# Patient Record
Sex: FEMALE | Race: WHITE | Employment: FULL TIME | URBAN - METROPOLITAN AREA
[De-identification: names, ages, dates, MRNs, and addresses within clinical notes are randomized per-mention and may not be internally consistent; named-entity substitution may affect disease eponyms.]

---

## 2017-01-08 ENCOUNTER — APPOINTMENT (OUTPATIENT)
Dept: GENERAL RADIOLOGY | Age: 62
End: 2017-01-08
Attending: NURSE PRACTITIONER
Payer: COMMERCIAL

## 2017-01-08 ENCOUNTER — HOSPITAL ENCOUNTER (EMERGENCY)
Age: 62
Discharge: HOME | End: 2017-01-08
Attending: EMERGENCY MEDICINE
Payer: COMMERCIAL

## 2017-01-08 VITALS
OXYGEN SATURATION: 100 % | SYSTOLIC BLOOD PRESSURE: 161 MMHG | WEIGHT: 293 LBS | TEMPERATURE: 97.7 F | HEIGHT: 65 IN | RESPIRATION RATE: 16 BRPM | BODY MASS INDEX: 48.82 KG/M2 | DIASTOLIC BLOOD PRESSURE: 83 MMHG | HEART RATE: 96 BPM

## 2017-01-08 DIAGNOSIS — M62.838 MUSCLE SPASM: ICD-10-CM

## 2017-01-08 DIAGNOSIS — N39.0 URINARY TRACT INFECTION WITHOUT HEMATURIA, SITE UNSPECIFIED: ICD-10-CM

## 2017-01-08 DIAGNOSIS — M54.9 ACUTE BILATERAL BACK PAIN, UNSPECIFIED BACK LOCATION: Primary | ICD-10-CM

## 2017-01-08 LAB
APPEARANCE UR: ABNORMAL
BACTERIA URNS QL MICRO: ABNORMAL /HPF
BILIRUB UR QL CFM: NEGATIVE
COLOR UR: ABNORMAL
EPITH CASTS URNS QL MICRO: ABNORMAL /LPF
GLUCOSE UR STRIP.AUTO-MCNC: NEGATIVE MG/DL
HGB UR QL STRIP: NEGATIVE
KETONES UR QL STRIP.AUTO: 15 MG/DL
LEUKOCYTE ESTERASE UR QL STRIP.AUTO: ABNORMAL
NITRITE UR QL STRIP.AUTO: NEGATIVE
PH UR STRIP: 6 [PH] (ref 5–8)
PROT UR STRIP-MCNC: 30 MG/DL
RBC #/AREA URNS HPF: ABNORMAL /HPF (ref 0–5)
SP GR UR REFRACTOMETRY: 1.03 (ref 1–1.03)
UA: UC IF INDICATED,UAUC: ABNORMAL
UROBILINOGEN UR QL STRIP.AUTO: 1 EU/DL (ref 0.2–1)
WBC URNS QL MICRO: ABNORMAL /HPF (ref 0–4)

## 2017-01-08 PROCEDURE — 72072 X-RAY EXAM THORAC SPINE 3VWS: CPT

## 2017-01-08 PROCEDURE — 87086 URINE CULTURE/COLONY COUNT: CPT | Performed by: EMERGENCY MEDICINE

## 2017-01-08 PROCEDURE — 74011250637 HC RX REV CODE- 250/637: Performed by: NURSE PRACTITIONER

## 2017-01-08 PROCEDURE — 99284 EMERGENCY DEPT VISIT MOD MDM: CPT

## 2017-01-08 PROCEDURE — 72100 X-RAY EXAM L-S SPINE 2/3 VWS: CPT

## 2017-01-08 PROCEDURE — 81001 URINALYSIS AUTO W/SCOPE: CPT | Performed by: NURSE PRACTITIONER

## 2017-01-08 RX ORDER — CYCLOBENZAPRINE HCL 10 MG
10 TABLET ORAL
Status: COMPLETED | OUTPATIENT
Start: 2017-01-08 | End: 2017-01-08

## 2017-01-08 RX ORDER — CYCLOBENZAPRINE HCL 10 MG
10 TABLET ORAL
Qty: 15 TAB | Refills: 0 | Status: SHIPPED | OUTPATIENT
Start: 2017-01-08 | End: 2018-07-14

## 2017-01-08 RX ORDER — HYDROCODONE BITARTRATE AND ACETAMINOPHEN 5; 325 MG/1; MG/1
1 TABLET ORAL
Qty: 8 TAB | Refills: 0 | Status: SHIPPED | OUTPATIENT
Start: 2017-01-08 | End: 2018-07-14

## 2017-01-08 RX ORDER — CEPHALEXIN 500 MG/1
500 CAPSULE ORAL 2 TIMES DAILY
Qty: 14 CAP | Refills: 0 | Status: SHIPPED | OUTPATIENT
Start: 2017-01-08 | End: 2017-01-15

## 2017-01-08 RX ADMIN — CYCLOBENZAPRINE HYDROCHLORIDE 10 MG: 10 TABLET, FILM COATED ORAL at 09:59

## 2017-01-08 NOTE — PROGRESS NOTES
CM received consult to assist pt with problem solving her need to get home, also to go by the bank and to fill prescriptions. Pt would like to take a cab but noted that she used EMS to get to hospital as she was unable to drive self due to road conditions and that EMS parked a block away but she had someone on either side to assist with walking. CM encouraged her to call family or friends and also provided her list of five private pay cab companies for her to call. Encouraged her to call from ED and to advise that she is requesting hand held assistance to get into her door as not sure all providers will accommodate this request.    Pt is aware and understands.     PING Irizarry

## 2017-01-08 NOTE — LETTER
[address] 
[address] [facility] [address] 
[phone] Work/School Note Date: 1/8/2017 To Whom It May concern: 
 
Princess Lynch was seen and treated today in the emergency room by the following provider(s): 
Attending Provider: Vicki Bright MD 
Nurse Practitioner: Bandar Chairez NP. Princess Lynch may return to work on 01/10/2017. Sincerely, Bandar Chairez NP

## 2017-01-08 NOTE — DISCHARGE INSTRUCTIONS
Back Pain: Care Instructions  Your Care Instructions    Back pain has many possible causes. It is often related to problems with muscles and ligaments of the back. It may also be related to problems with the nerves, discs, or bones of the back. Moving, lifting, standing, sitting, or sleeping in an awkward way can strain the back. Sometimes you don't notice the injury until later. Arthritis is another common cause of back pain. Although it may hurt a lot, back pain usually improves on its own within several weeks. Most people recover in 12 weeks or less. Using good home treatment and being careful not to stress your back can help you feel better sooner. Follow-up care is a key part of your treatment and safety. Be sure to make and go to all appointments, and call your doctor if you are having problems. Its also a good idea to know your test results and keep a list of the medicines you take. How can you care for yourself at home? · Sit or lie in positions that are most comfortable and reduce your pain. Try one of these positions when you lie down:  ¨ Lie on your back with your knees bent and supported by large pillows. ¨ Lie on the floor with your legs on the seat of a sofa or chair. Gage Butler on your side with your knees and hips bent and a pillow between your legs. ¨ Lie on your stomach if it does not make pain worse. · Do not sit up in bed, and avoid soft couches and twisted positions. Bed rest can help relieve pain at first, but it delays healing. Avoid bed rest after the first day of back pain. · Change positions every 30 minutes. If you must sit for long periods of time, take breaks from sitting. Get up and walk around, or lie in a comfortable position. · Try using a heating pad on a low or medium setting for 15 to 20 minutes every 2 or 3 hours. Try a warm shower in place of one session with the heating pad. · You can also try an ice pack for 10 to 15 minutes every 2 to 3 hours.  Put a thin cloth between the ice pack and your skin. · Take pain medicines exactly as directed. ¨ If the doctor gave you a prescription medicine for pain, take it as prescribed. ¨ If you are not taking a prescription pain medicine, ask your doctor if you can take an over-the-counter medicine. · Take short walks several times a day. You can start with 5 to 10 minutes, 3 or 4 times a day, and work up to longer walks. Walk on level surfaces and avoid hills and stairs until your back is better. · Return to work and other activities as soon as you can. Continued rest without activity is usually not good for your back. · To prevent future back pain, do exercises to stretch and strengthen your back and stomach. Learn how to use good posture, safe lifting techniques, and proper body mechanics. When should you call for help? Call your doctor now or seek immediate medical care if:  · You have new or worsening numbness in your legs. · You have new or worsening weakness in your legs. (This could make it hard to stand up.)  · You lose control of your bladder or bowels. Watch closely for changes in your health, and be sure to contact your doctor if:  · Your pain gets worse. · You are not getting better after 2 weeks. Where can you learn more? Go to http://nicolette-luana.info/. Enter W382 in the search box to learn more about \"Back Pain: Care Instructions. \"  Current as of: May 23, 2016  Content Version: 11.1  © 0050-8075 [employer]. Care instructions adapted under license by [employer] (which disclaims liability or warranty for this information). If you have questions about a medical condition or this instruction, always ask your healthcare professional. [place] [address] any warranty or liability for your use of this information.          Urinary Tract Infection in Women: Care Instructions  Your Care Instructions    A urinary tract infection, or UTI, is a general term for an infection anywhere between the kidneys and the urethra (where urine comes out). Most UTIs are bladder infections. They often cause pain or burning when you urinate. UTIs are caused by bacteria and can be cured with antibiotics. Be sure to complete your treatment so that the infection goes away. Follow-up care is a key part of your treatment and safety. Be sure to make and go to all appointments, and call your doctor if you are having problems. It's also a good idea to know your test results and keep a list of the medicines you take. How can you care for yourself at home? · Take your antibiotics as directed. Do not stop taking them just because you feel better. You need to take the full course of antibiotics. · Drink extra water and other fluids for the next day or two. This may help wash out the bacteria that are causing the infection. (If you have kidney, heart, or liver disease and have to limit fluids, talk with your doctor before you increase your fluid intake.)  · Avoid drinks that are carbonated or have caffeine. They can irritate the bladder. · Urinate often. Try to empty your bladder each time. · To relieve pain, take a hot bath or lay a heating pad set on low over your lower belly or genital area. Never go to sleep with a heating pad in place. To prevent UTIs  · Drink plenty of water each day. This helps you urinate often, which clears bacteria from your system. (If you have kidney, heart, or liver disease and have to limit fluids, talk with your doctor before you increase your fluid intake.)  · Consider adding cranberry juice to your diet. · Urinate when you need to. · Urinate right after you have sex. · Change sanitary pads often. · Avoid douches, bubble baths, feminine hygiene sprays, and other feminine hygiene products that have deodorants. · After going to the bathroom, wipe from front to back. When should you call for help?   Call your doctor now or seek immediate medical care if:  · Symptoms such as fever, chills, nausea, or vomiting get worse or appear for the first time. · You have new pain in your back just below your rib cage. This is called flank pain. · There is new blood or pus in your urine. · You have any problems with your antibiotic medicine. Watch closely for changes in your health, and be sure to contact your doctor if:  · You are not getting better after taking an antibiotic for 2 days. · Your symptoms go away but then come back. Where can you learn more? Go to http://nicolette-luana.info/. Enter X920 in the search box to learn more about \"Urinary Tract Infection in Women: Care Instructions. \"  Current as of: August 12, 2016  Content Version: 11.1  © 0894-0183 [employer]. Care instructions adapted under license by [employer] (which disclaims liability or warranty for this information). If you have questions about a medical condition or this instruction, always ask your healthcare professional. [place] [address] any warranty or liability for your use of this information. We hope that we have addressed all of your medical concerns. The examination and treatment you received in the Emergency Department were for an emergent problem and were not intended as complete care. It is important that you follow up with your healthcare provider(s) for ongoing care. If your symptoms worsen or do not improve as expected, and you are unable to reach your usual health care provider(s), you should return to the Emergency Department. Today's healthcare is undergoing tremendous change, and patient satisfaction surveys are one of the many tools to assess the quality of medical care. You may receive a survey from the [employer] organization regarding your experience in the Emergency Department. I hope that your experience has been completely positive, particularly the medical care that I provided.   As such, please participate in the survey; anything less than excellent does not meet my expectations or intentions. [facility] and [facility] participate in nationally recognized quality of care measures. If your blood pressure is greater than 120/80, as reported below, we urge that you seek medical care to address the potential of high blood pressure, commonly known as hypertension. Hypertension can be hereditary or can be caused by certain medical conditions, pain, stress, or \"white coat syndrome. \"       Please make an appointment with your health care provider(s) for follow up of your Emergency Department visit. VITALS:   Patient Vitals for the past 8 hrs:   Temp Pulse Resp BP SpO2   01/08/17 0934 97.7 °F (36.5 °C) 96 16 161/83 100 %          Thank you for allowing us to provide you with medical care today. We realize that you have many choices for your emergency care needs. Please choose us in the future for any continued health care needs. Neeru Nicolasite, NP    [address] [facility].   Office: [phone]            Recent Results (from the past 24 hour(s))   URINALYSIS W/ REFLEX CULTURE    Collection Time: 01/08/17 10:47 AM   Result Value Ref Range    Color DARK YELLOW      Appearance CLOUDY (A) CLEAR      Specific gravity 1.027 1.003 - 1.030      pH (UA) 6.0 5.0 - 8.0      Protein 30 (A) NEG mg/dL    Glucose NEGATIVE  NEG mg/dL    Ketone 15 (A) NEG mg/dL    Blood NEGATIVE  NEG      Urobilinogen 1.0 0.2 - 1.0 EU/dL    Nitrites NEGATIVE  NEG      Leukocyte Esterase SMALL (A) NEG      WBC 0-4 0 - 4 /hpf    RBC 0-5 0 - 5 /hpf    Epithelial cells FEW FEW /lpf    Bacteria 1+ (A) NEG /hpf    UA:UC IF INDICATED URINE CULTURE ORDERED (A) CNI     BILIRUBIN, CONFIRM    Collection Time: 01/08/17 10:47 AM   Result Value Ref Range    Bilirubin UA, confirm NEGATIVE  NEG         Xr Spine Thorac 3 V    Result Date: 1/8/2017  THORACIC SPINE, 3 VIEWS. 1/8/2017 10:15 AM INDICATION: back pain. COMPARISON: None. FINDINGS: Frontal, lateral, and swimmer's radiographs of the thoracic spine. The cervicothoracic junction is obscured on both lateral radiographs. Mild dextroconvexity is centered around the thoracolumbar junction. Diffuse degenerative disc disease is moderate. There are flowing anterior osteophytes. Facet osteoarthritis is present in the lower thoracic spine. IMPRESSION: Moderate degenerative disease. Xr Spine Lumb 2 Or 3 V    Result Date: 1/8/2017  LUMBAR SPINE, 3 VIEWS. 1/8/2017 10:15 AM INDICATION: back pain COMPARISON: None. FINDINGS: Frontal, lateral, and lumbosacral radiographs. Vertebral body heights are normal. L1-L2 and L5-S1 degenerative disc disease is moderate. L4-S1 facet osteoarthritis is moderate. Cholecystectomy clips in the right upper quadrant. IMPRESSION: Moderate degenerative disease.

## 2017-01-08 NOTE — ED PROVIDER NOTES
HPI Comments: 60 yo F with no previous medical hx presents for R sided back spasms for one month, worse since yesterday. She states yesterday pain intensified after lifting boxes and twisting the wrong way. She denies radiation of pain down either leg, loss of control of bowel or bladder, numbness or tingling of lower extremities. + urinary frequency, denies dysuria. Tx PTA includes 2 Aleve this am.      History reviewed. No pertinent past medical history. Social History    Marital status:                     Spouse name:                       Years of education:                 Number of children:               Occupational History    None on file    Social History Main Topics    Smoking status: Current Every Day Smoker                                                     Packs/day: 0.00      Years: 0.00           Smokeless status: Not on file                       Alcohol use: Yes             Drug use: Yes                Special: Marijuana    Sexual activity: Not on file          Other Topics            Concern    None on file    Social History Narrative    None on file        Patient is a 59 y.o. female presenting with back pain. Back Pain    Pertinent negatives include no chest pain, no fever, no numbness, no headaches, no abdominal pain, no dysuria and no weakness. History reviewed. No pertinent past medical history. Past Surgical History:   Procedure Laterality Date    Hx orthopaedic       back pain    Hx cholecystectomy           History reviewed. No pertinent family history. Social History     Social History    Marital status: N/A     Spouse name: N/A    Number of children: N/A    Years of education: N/A     Occupational History    Not on file.      Social History Main Topics    Smoking status: Current Every Day Smoker    Smokeless tobacco: Not on file    Alcohol use Yes    Drug use: Yes     Special: Marijuana    Sexual activity: Not on file     Other Topics Concern    Not on file     Social History Narrative    No narrative on file         ALLERGIES: Review of patient's allergies indicates no known allergies. Review of Systems   Constitutional: Negative for activity change, appetite change, chills and fever. HENT: Negative for ear discharge and facial swelling. Eyes: Negative for discharge and redness. Respiratory: Negative for chest tightness, shortness of breath and wheezing. Cardiovascular: Negative for chest pain, palpitations and leg swelling. Gastrointestinal: Negative for abdominal pain, diarrhea, nausea, rectal pain and vomiting. Genitourinary: Positive for frequency. Negative for difficulty urinating, dysuria, hematuria and urgency. Musculoskeletal: Positive for back pain. Negative for joint swelling, neck pain and neck stiffness. Skin: Negative for rash. Neurological: Negative for dizziness, seizures, speech difficulty, weakness, numbness and headaches. Psychiatric/Behavioral: Negative for confusion. Vitals:    01/08/17 0934   BP: 161/83   Pulse: 96   Resp: 16   Temp: 97.7 °F (36.5 °C)   SpO2: 100%   Weight: 136.1 kg (300 lb)   Height: 5' 5\" (1.651 m)            Physical Exam   Constitutional: She is oriented to person, place, and time. She appears well-developed and well-nourished. No distress. HENT:   Head: Normocephalic and atraumatic. Eyes: Conjunctivae and EOM are normal. Pupils are equal, round, and reactive to light. Neck: Normal range of motion. Neck supple. Cardiovascular: Normal rate, regular rhythm, normal heart sounds and intact distal pulses. Pulmonary/Chest: Effort normal and breath sounds normal. No accessory muscle usage. No tachypnea. No respiratory distress. She has no decreased breath sounds. She has no wheezes. B breath sounds CTA. No expiratory wheezing, crackles or rhonchi. No chest wall tenderness, tachycardia or tachypnea. Abdominal: Soft.  Normal appearance and bowel sounds are normal. She exhibits no distension and no mass. There is no tenderness. There is no rebound, no guarding and no CVA tenderness. Abdomen soft, nontender with active BS throughout. No rigidity, masses or guarding. No rebound tenderness, flank or CVA tenderness. + suprapubic tenderness. Musculoskeletal: Normal range of motion. No cervical spinous process tenderness, no TLS spinous process tenderness. + muscular tenderness to B middle and lower back with no tenderness over SI joint, gluteal region. 5/5 strength of lower extremities, no decreased sensation, no decreased ROM. Neurological: She is alert and oriented to person, place, and time. She has normal strength. She displays no atrophy. No cranial nerve deficit or sensory deficit. She exhibits normal muscle tone. Coordination and gait normal. GCS eye subscore is 4. GCS verbal subscore is 5. GCS motor subscore is 6. Skin: Skin is warm and dry. Psychiatric: She has a normal mood and affect. Her behavior is normal. Judgment and thought content normal.   Nursing note and vitals reviewed. MDM  Number of Diagnoses or Management Options  Acute bilateral back pain, unspecified back location:   Muscle spasm:   Urinary tract infection without hematuria, site unspecified:   Diagnosis management comments: 60 yo F with one month hx of R sided back pain, worse since yesterday after lifting heavy boxes and \"twisting the wrong way\". She denies trauma, loss of control of bowel or bladder. Denies radiation of discomfort, numbness or tingling of lower extremities. No decreased ROM of lower extremities. + pain with bending, lateral movements of back. + urinary frequency but denies dysuria, hematuria.  UA, Xray of T and LS spine ordered. Medication for spasm ordered. Pt took 2 Aleve PTA.  + improvement of discomfort after administration of medication for spasm. Xrays negative for acute injury. Will discharge pt with medication for pain and spasm, and medication for UTI. Discussed with pt the need for FU with PCP (provider names offered at the time of discharge), return to the Emergency Department for worsening sx which were discussed prior to discharge.          Amount and/or Complexity of Data Reviewed  Clinical lab tests: ordered and reviewed  Tests in the radiology section of CPT®: ordered and reviewed  Discuss the patient with other providers: yes (Dr. Yamel Moss )    Patient Progress  Patient progress: stable    ED Course       Procedures

## 2017-01-09 LAB
BACTERIA SPEC CULT: NORMAL
CC UR VC: NORMAL
SERVICE CMNT-IMP: NORMAL

## 2018-07-14 ENCOUNTER — APPOINTMENT (OUTPATIENT)
Dept: GENERAL RADIOLOGY | Age: 63
End: 2018-07-14
Attending: EMERGENCY MEDICINE
Payer: COMMERCIAL

## 2018-07-14 ENCOUNTER — APPOINTMENT (OUTPATIENT)
Dept: CT IMAGING | Age: 63
End: 2018-07-14
Attending: EMERGENCY MEDICINE
Payer: COMMERCIAL

## 2018-07-14 ENCOUNTER — HOSPITAL ENCOUNTER (OUTPATIENT)
Age: 63
Setting detail: OBSERVATION
Discharge: HOME | End: 2018-07-15
Attending: EMERGENCY MEDICINE | Admitting: HOSPITALIST
Payer: COMMERCIAL

## 2018-07-14 DIAGNOSIS — R07.9 CHEST PAIN, UNSPECIFIED TYPE: Primary | ICD-10-CM

## 2018-07-14 PROBLEM — R07.81 PLEURITIC CHEST PAIN: Status: ACTIVE | Noted: 2018-07-14

## 2018-07-14 LAB
ALBUMIN SERPL-MCNC: 3.4 G/DL (ref 3.5–5)
ALBUMIN/GLOB SERPL: 0.6 {RATIO} (ref 1.1–2.2)
ALP SERPL-CCNC: 153 U/L (ref 45–117)
ALT SERPL-CCNC: 41 U/L (ref 12–78)
ANION GAP SERPL CALC-SCNC: 6 MMOL/L (ref 5–15)
AST SERPL-CCNC: 48 U/L (ref 15–37)
ATRIAL RATE: 99 BPM
BASOPHILS # BLD: 0 K/UL (ref 0–0.1)
BASOPHILS NFR BLD: 0 % (ref 0–1)
BILIRUB SERPL-MCNC: 0.9 MG/DL (ref 0.2–1)
BUN SERPL-MCNC: 13 MG/DL (ref 6–20)
BUN/CREAT SERPL: 15 (ref 12–20)
CALCIUM SERPL-MCNC: 8.9 MG/DL (ref 8.5–10.1)
CALCULATED P AXIS, ECG09: 66 DEGREES
CALCULATED R AXIS, ECG10: 36 DEGREES
CALCULATED T AXIS, ECG11: 74 DEGREES
CHLORIDE SERPL-SCNC: 100 MMOL/L (ref 97–108)
CO2 SERPL-SCNC: 30 MMOL/L (ref 21–32)
CREAT SERPL-MCNC: 0.89 MG/DL (ref 0.55–1.02)
CRP SERPL-MCNC: 2.07 MG/DL (ref 0–0.6)
D DIMER PPP FEU-MCNC: 1.04 MG/L FEU (ref 0–0.65)
DIAGNOSIS, 93000: NORMAL
DIFFERENTIAL METHOD BLD: ABNORMAL
EOSINOPHIL # BLD: 0.2 K/UL (ref 0–0.4)
EOSINOPHIL NFR BLD: 2 % (ref 0–7)
ERYTHROCYTE [DISTWIDTH] IN BLOOD BY AUTOMATED COUNT: 14.8 % (ref 11.5–14.5)
ERYTHROCYTE [SEDIMENTATION RATE] IN BLOOD: 15 MM/HR (ref 0–30)
GLOBULIN SER CALC-MCNC: 5.4 G/DL (ref 2–4)
GLUCOSE SERPL-MCNC: 137 MG/DL (ref 65–100)
HCT VFR BLD AUTO: 49 % (ref 35–47)
HGB BLD-MCNC: 16.1 G/DL (ref 11.5–16)
IMM GRANULOCYTES # BLD: 0 K/UL (ref 0–0.04)
IMM GRANULOCYTES NFR BLD AUTO: 0 % (ref 0–0.5)
LYMPHOCYTES # BLD: 1.9 K/UL (ref 0.8–3.5)
LYMPHOCYTES NFR BLD: 20 % (ref 12–49)
MCH RBC QN AUTO: 32.4 PG (ref 26–34)
MCHC RBC AUTO-ENTMCNC: 32.9 G/DL (ref 30–36.5)
MCV RBC AUTO: 98.6 FL (ref 80–99)
MONOCYTES # BLD: 0.9 K/UL (ref 0–1)
MONOCYTES NFR BLD: 9 % (ref 5–13)
NEUTS SEG # BLD: 6.6 K/UL (ref 1.8–8)
NEUTS SEG NFR BLD: 69 % (ref 32–75)
NRBC # BLD: 0 K/UL (ref 0–0.01)
NRBC BLD-RTO: 0 PER 100 WBC
P-R INTERVAL, ECG05: 166 MS
PLATELET # BLD AUTO: 173 K/UL (ref 150–400)
PMV BLD AUTO: 10.7 FL (ref 8.9–12.9)
POTASSIUM SERPL-SCNC: 4 MMOL/L (ref 3.5–5.1)
PROT SERPL-MCNC: 8.8 G/DL (ref 6.4–8.2)
Q-T INTERVAL, ECG07: 316 MS
QRS DURATION, ECG06: 62 MS
QTC CALCULATION (BEZET), ECG08: 405 MS
RBC # BLD AUTO: 4.97 M/UL (ref 3.8–5.2)
SODIUM SERPL-SCNC: 136 MMOL/L (ref 136–145)
TROPONIN I BLD-MCNC: <0.04 NG/ML (ref 0–0.08)
TROPONIN I SERPL-MCNC: <0.05 NG/ML
TROPONIN I SERPL-MCNC: <0.05 NG/ML
VENTRICULAR RATE, ECG03: 99 BPM
WBC # BLD AUTO: 9.7 K/UL (ref 3.6–11)

## 2018-07-14 PROCEDURE — 99285 EMERGENCY DEPT VISIT HI MDM: CPT

## 2018-07-14 PROCEDURE — 36415 COLL VENOUS BLD VENIPUNCTURE: CPT | Performed by: HOSPITALIST

## 2018-07-14 PROCEDURE — 99218 HC RM OBSERVATION: CPT

## 2018-07-14 PROCEDURE — 80053 COMPREHEN METABOLIC PANEL: CPT | Performed by: EMERGENCY MEDICINE

## 2018-07-14 PROCEDURE — 85025 COMPLETE CBC W/AUTO DIFF WBC: CPT | Performed by: EMERGENCY MEDICINE

## 2018-07-14 PROCEDURE — 74011636320 HC RX REV CODE- 636/320: Performed by: EMERGENCY MEDICINE

## 2018-07-14 PROCEDURE — 84484 ASSAY OF TROPONIN QUANT: CPT | Performed by: EMERGENCY MEDICINE

## 2018-07-14 PROCEDURE — 86140 C-REACTIVE PROTEIN: CPT | Performed by: HOSPITALIST

## 2018-07-14 PROCEDURE — 74011250637 HC RX REV CODE- 250/637: Performed by: EMERGENCY MEDICINE

## 2018-07-14 PROCEDURE — 74011250637 HC RX REV CODE- 250/637: Performed by: HOSPITALIST

## 2018-07-14 PROCEDURE — 74011000258 HC RX REV CODE- 258: Performed by: EMERGENCY MEDICINE

## 2018-07-14 PROCEDURE — 93971 EXTREMITY STUDY: CPT

## 2018-07-14 PROCEDURE — 71046 X-RAY EXAM CHEST 2 VIEWS: CPT

## 2018-07-14 PROCEDURE — 93005 ELECTROCARDIOGRAM TRACING: CPT

## 2018-07-14 PROCEDURE — 74011250636 HC RX REV CODE- 250/636: Performed by: HOSPITALIST

## 2018-07-14 PROCEDURE — 85379 FIBRIN DEGRADATION QUANT: CPT | Performed by: EMERGENCY MEDICINE

## 2018-07-14 PROCEDURE — 71275 CT ANGIOGRAPHY CHEST: CPT

## 2018-07-14 PROCEDURE — 85652 RBC SED RATE AUTOMATED: CPT | Performed by: HOSPITALIST

## 2018-07-14 PROCEDURE — 96372 THER/PROPH/DIAG INJ SC/IM: CPT

## 2018-07-14 RX ORDER — COLCHICINE 0.6 MG/1
0.6 TABLET ORAL 2 TIMES DAILY
Status: DISCONTINUED | OUTPATIENT
Start: 2018-07-14 | End: 2018-07-15 | Stop reason: HOSPADM

## 2018-07-14 RX ORDER — ACETAMINOPHEN 325 MG/1
650 TABLET ORAL
Status: DISCONTINUED | OUTPATIENT
Start: 2018-07-14 | End: 2018-07-15 | Stop reason: HOSPADM

## 2018-07-14 RX ORDER — ONDANSETRON 2 MG/ML
4 INJECTION INTRAMUSCULAR; INTRAVENOUS
Status: DISCONTINUED | OUTPATIENT
Start: 2018-07-14 | End: 2018-07-15 | Stop reason: HOSPADM

## 2018-07-14 RX ORDER — INDOMETHACIN 25 MG/1
25 CAPSULE ORAL
Status: DISCONTINUED | OUTPATIENT
Start: 2018-07-14 | End: 2018-07-15 | Stop reason: HOSPADM

## 2018-07-14 RX ORDER — INDOMETHACIN 25 MG/1
25 CAPSULE ORAL
Status: DISCONTINUED | OUTPATIENT
Start: 2018-07-14 | End: 2018-07-14

## 2018-07-14 RX ORDER — SODIUM CHLORIDE 0.9 % (FLUSH) 0.9 %
5-10 SYRINGE (ML) INJECTION EVERY 8 HOURS
Status: DISCONTINUED | OUTPATIENT
Start: 2018-07-14 | End: 2018-07-15 | Stop reason: HOSPADM

## 2018-07-14 RX ORDER — ENOXAPARIN SODIUM 100 MG/ML
40 INJECTION SUBCUTANEOUS EVERY 24 HOURS
Status: DISCONTINUED | OUTPATIENT
Start: 2018-07-14 | End: 2018-07-15 | Stop reason: HOSPADM

## 2018-07-14 RX ORDER — SODIUM CHLORIDE 0.9 % (FLUSH) 0.9 %
5-10 SYRINGE (ML) INJECTION AS NEEDED
Status: DISCONTINUED | OUTPATIENT
Start: 2018-07-14 | End: 2018-07-15 | Stop reason: HOSPADM

## 2018-07-14 RX ORDER — COLCHICINE 0.6 MG/1
0.6 TABLET ORAL 2 TIMES DAILY
Status: DISCONTINUED | OUTPATIENT
Start: 2018-07-14 | End: 2018-07-14

## 2018-07-14 RX ORDER — SODIUM CHLORIDE 0.9 % (FLUSH) 0.9 %
10 SYRINGE (ML) INJECTION
Status: COMPLETED | OUTPATIENT
Start: 2018-07-14 | End: 2018-07-14

## 2018-07-14 RX ORDER — NAPROXEN SODIUM 220 MG
220 TABLET ORAL 2 TIMES DAILY WITH MEALS
COMMUNITY
End: 2018-07-15

## 2018-07-14 RX ORDER — NITROGLYCERIN 0.4 MG/1
0.4 TABLET SUBLINGUAL
Status: COMPLETED | OUTPATIENT
Start: 2018-07-14 | End: 2018-07-14

## 2018-07-14 RX ORDER — FAMOTIDINE 20 MG/1
20 TABLET, FILM COATED ORAL 2 TIMES DAILY
Status: DISCONTINUED | OUTPATIENT
Start: 2018-07-14 | End: 2018-07-15 | Stop reason: HOSPADM

## 2018-07-14 RX ADMIN — ENOXAPARIN SODIUM 40 MG: 40 INJECTION, SOLUTION INTRAVENOUS; SUBCUTANEOUS at 15:26

## 2018-07-14 RX ADMIN — NITROGLYCERIN 0.4 MG: 0.4 TABLET, ORALLY DISINTEGRATING SUBLINGUAL at 11:08

## 2018-07-14 RX ADMIN — NITROGLYCERIN 0.4 MG: 0.4 TABLET, ORALLY DISINTEGRATING SUBLINGUAL at 11:18

## 2018-07-14 RX ADMIN — COLCHICINE 0.6 MG: 0.6 TABLET, FILM COATED ORAL at 14:04

## 2018-07-14 RX ADMIN — Medication 10 ML: at 15:28

## 2018-07-14 RX ADMIN — INDOMETHACIN 25 MG: 25 CAPSULE ORAL at 14:04

## 2018-07-14 RX ADMIN — COLCHICINE 0.6 MG: 0.6 TABLET, FILM COATED ORAL at 17:47

## 2018-07-14 RX ADMIN — FAMOTIDINE 20 MG: 20 TABLET ORAL at 17:47

## 2018-07-14 RX ADMIN — NITROGLYCERIN 0.4 MG: 0.4 TABLET, ORALLY DISINTEGRATING SUBLINGUAL at 11:13

## 2018-07-14 RX ADMIN — IOPAMIDOL 100 ML: 755 INJECTION, SOLUTION INTRAVENOUS at 11:33

## 2018-07-14 RX ADMIN — SODIUM CHLORIDE 100 ML: 900 INJECTION, SOLUTION INTRAVENOUS at 11:33

## 2018-07-14 RX ADMIN — Medication 10 ML: at 11:33

## 2018-07-14 RX ADMIN — INDOMETHACIN 25 MG: 25 CAPSULE ORAL at 17:47

## 2018-07-14 NOTE — PROGRESS NOTES
TRANSFER - IN REPORT:    Verbal report received from Peter Chin RN(name) on Andressa Cos  being received from ED(unit) for routine progression of care      Report consisted of patients Situation, Background, Assessment and   Recommendations(SBAR). Information from the following report(s) SBAR, Kardex, Intake/Output, MAR and Recent Results was reviewed with the receiving nurse. Opportunity for questions and clarification was provided. Assessment completed upon patients arrival to unit and care assumed.

## 2018-07-14 NOTE — ED PROVIDER NOTES
HPI Comments: 57 y.o. female with past medical history of Arthritis who presents from home by EMS with chief complaint of chest pain. Patient states onset of moderate constant chest pain with radiation to her right jaw and ear that woke her up out of her sleep ~4 hours ago ago and still persists now. She also c/o of a \"heartburn\" sensation beginning last evening that resolved before falling asleep. Patient claims that the pain \"is not sharp\" but prevents her from being able to \"take deep breaths. \"  Patient was given 3 baby ASA by EMS en route with little relief. She mentions accompanying right leg pain that feels like \"cramping\" within the past ten minutes. Of note, patient also mentions that she has a headache. No NTG PTA. No cardiac hx or hx of chest pain or exertion. No SOB. Patient denies diaphoresis, nausea, vomiting, and abdominal pain. There are no other acute medical concerns at this time. Social hx: current everyday tobacco smoker; (+) EtOH use    Note written by Atif Arzate, as dictated by Doyne Heimlich, MD 9:30 AM    The history is provided by the patient. No  was used. History reviewed. No pertinent past medical history. Past Surgical History:   Procedure Laterality Date    HX CHOLECYSTECTOMY      HX ORTHOPAEDIC      back pain         History reviewed. No pertinent family history. Social History     Social History    Marital status: SINGLE     Spouse name: N/A    Number of children: N/A    Years of education: N/A     Occupational History    Not on file. Social History Main Topics    Smoking status: Current Every Day Smoker    Smokeless tobacco: Not on file    Alcohol use Yes    Drug use: Yes     Special: Marijuana    Sexual activity: Not on file     Other Topics Concern    Not on file     Social History Narrative         ALLERGIES: Review of patient's allergies indicates no known allergies.     Review of Systems   Constitutional: Negative for activity change, appetite change, diaphoresis and fatigue. HENT: Negative for ear pain, facial swelling, sore throat and trouble swallowing. Eyes: Negative for pain, discharge and visual disturbance. Respiratory: Negative for chest tightness, shortness of breath and wheezing. Cardiovascular: Positive for chest pain. Negative for palpitations. Gastrointestinal: Negative for abdominal pain, blood in stool, nausea and vomiting. Genitourinary: Negative for difficulty urinating, flank pain and hematuria. Musculoskeletal: Positive for myalgias. Negative for arthralgias, joint swelling and neck pain. Skin: Negative for color change and rash. Neurological: Negative for dizziness, weakness, numbness and headaches. Hematological: Negative for adenopathy. Does not bruise/bleed easily. Psychiatric/Behavioral: Negative for behavioral problems, confusion and sleep disturbance. All other systems reviewed and are negative. Vitals:    07/14/18 1123 07/14/18 1152 07/14/18 1300 07/14/18 1432   BP: (!) 178/94 137/66 143/66 167/73   Pulse: 98 100 97 96   Resp: 24 24 24 20   Temp: 99.1 °F (37.3 °C)   98.5 °F (36.9 °C)   SpO2:   95% 93%   Height:                Physical Exam   Constitutional: She is oriented to person, place, and time. She appears well-developed and well-nourished. No distress. HENT:   Head: Normocephalic and atraumatic. Nose: Nose normal.   Mouth/Throat: Oropharynx is clear and moist.   Tenderness over R-jaw. Eyes: Conjunctivae and EOM are normal. Pupils are equal, round, and reactive to light. No scleral icterus. Neck: Normal range of motion. Neck supple. No JVD present. No tracheal deviation present. No thyromegaly present. No carotid bruits noted. Cardiovascular: Normal rate, regular rhythm, normal heart sounds and intact distal pulses. Exam reveals no gallop and no friction rub. No murmur heard.   Pulmonary/Chest: Effort normal and breath sounds normal. No respiratory distress. She has no wheezes. She has no rales. She exhibits no tenderness. No focal chest tenderness. Abdominal: Soft. Bowel sounds are normal. She exhibits no distension and no mass. There is no tenderness. There is no rebound and no guarding. Musculoskeletal: Normal range of motion. She exhibits edema (trace edema in LE, no cords or tenderness). She exhibits no tenderness. Lymphadenopathy:     She has no cervical adenopathy. Neurological: She is alert and oriented to person, place, and time. She has normal reflexes. No cranial nerve deficit. Coordination normal.   Skin: Skin is warm and dry. No rash noted. No erythema. Psychiatric: She has a normal mood and affect. Her behavior is normal. Judgment and thought content normal.   Nursing note and vitals reviewed. Note written by Atif Rivera, as dictated by Rigoberto Birmingham MD 9:30 AM    MDM  Number of Diagnoses or Management Options     Amount and/or Complexity of Data Reviewed  Clinical lab tests: ordered and reviewed  Tests in the radiology section of CPT®: ordered and reviewed  Decide to obtain previous medical records or to obtain history from someone other than the patient: yes  Review and summarize past medical records: yes  Discuss the patient with other providers: yes  Independent visualization of images, tracings, or specimens: yes    Risk of Complications, Morbidity, and/or Mortality  Presenting problems: high  Diagnostic procedures: high  Management options: high    Patient Progress  Patient progress: stable        ED Course       Procedures    ED EKG interpretation:  Rhythm: normal sinus rhythm; and regular . Rate (approx.): 99; Axis: normal; normal intervals; occasional PVC. Note written by Atif Rivera, as dictated by Oz Forrest MD 10:13 AM    PROGRESS NOTE:  10:05 AM  Pt's doppler of the RLE is negative. PROGRESS NOTE:  10:59 AM  Pt has a D dimer of 1.04.  Will order CT chest because pt has pleuritic pain that is non reproducible with leg pain. Will repeat troponin after CT. PROGRESS NOTE:  11:09 AM  Pt was just given the NTG. PROGRESS NOTE:  11:30 AM  Pt did not have any pain relief with NTG. PROGRESS NOTE:  11:53 AM  Repeat troponin was ordered. Waiting on CT report. CONSULT NOTE:  1:20 PM Oz Forrest MD spoke with Dr. David Martell, Consult for Hospitalist.  Discussed available diagnostic tests and clinical findings. Dr. David Martell will accept the pt for admission.

## 2018-07-14 NOTE — PROGRESS NOTES
Admission Medication Reconciliation:    Information obtained from: Patient, RX Query    Significant PMH/Disease States:   History reviewed. No pertinent past medical history. Chief Complaint for this Admission:  CP    Allergies:  Review of patient's allergies indicates no known allergies. Prior to Admission Medications:   Prior to Admission Medications   Prescriptions Last Dose Informant Patient Reported? Taking?   naproxen sodium (ALEVE) 220 mg tablet 7/7/2018 at Unknown time  Yes Yes   Sig: Take 220 mg by mouth two (2) times daily (with meals). vit C/vit E ac/lut/copper/zinc (PRESERVISION LUTEIN PO) 7/14/2018 at Unknown time  Yes Yes   Sig: Take  by mouth. Facility-Administered Medications: None         Comments/Recommendations: Patient is reliable historian, allergies were reviewed. States that she takes no prescriptions of any kind, just uses Preservision for her eyes and an occasional Aleve. Added:  1. Preservision  2. Aleve    Thank you for allowing me to participate in the care of your patient.     Billy Montgomery PharmD, RN #[id number]

## 2018-07-14 NOTE — IP AVS SNAPSHOT
Summary of Care Report The Summary of Care report has been created to help improve care coordination. Users with access to [employer] or [address] (Web-based application) may access additional patient information including the Discharge Summary. If you are not currently a [address] user and need more information, please call the number listed below in the [place] [address] section and ask to be connected with Medical Records. Facility Information Name Address Phone [phone] [place] [phone] Patient Information Patient Name Sex  Pao Canseco ([id number]) Female 1955 Discharge Information Admitting Provider Service Area Unit Modesta العلي MD /  [address] Med Surg / [phone] Discharge Provider Discharge Date/Time Discharge Disposition Destination (none) 7/15/2018 (Pending) AHR (none) Patient Language Language ENGLISH [13] Hospital Problems as of 7/15/2018  Never Reviewed Class Noted - Resolved Last Modified POA Active Problems Pleuritic chest pain  2018 - Present 2018 by Modesta العلي MD Unknown Entered by Modesta العلي MD  
  
You are allergic to the following No active allergies Current Discharge Medication List  
  
START taking these medications Dose & Instructions Dispensing Information Comments  
 colchicine 0.6 mg tablet Dose:  0.6 mg Take 1 Tab by mouth two (2) times a day for 45 days. Quantity:  90 Tab Refills:  0  
   
 famotidine 20 mg tablet Commonly known as:  PEPCID Dose:  20 mg Take 1 Tab by mouth two (2) times a day. Quantity:  60 Tab Refills:  1  
   
 indomethacin 25 mg capsule Commonly known as:  INDOCIN  Dose:  25 mg  
 Take 1 Cap by mouth three (3) times daily (with meals) for 14 days. Quantity:  42 Cap Refills:  0 STOP taking these medications Comments ALEVE 220 mg tablet Generic drug:  naproxen sodium PRESERVISION LUTEIN PO Follow-up Information Follow up With Details Comments Contact Info  
 your PCP In 2 weeks None   None (395) Patient stated that they have no PCP Discharge Instructions Please bring this form with you to show your primary care provider at your follow-up appointment. Primary care provider:  Dr. Ashia Kim Discharging provider:  Modesta العلي MD 
 
You have been admitted to the hospital with the following diagnoses: · Pleuritic chest pain · Pericarditis FOLLOW-UP CARE RECOMMENDATIONS: 
 
APPOINTMENTS: 
Follow-up Information Follow up With Details Comments Contact Info  
 your PCP In 2 weeks FOLLOW-UP TESTS recommended: NONE PENDING TEST RESULTS: 
At the time of your discharge the following test results are still pending: NONE Please make sure you review these results with your outpatient follow-up provider(s). SYMPTOMS to watch for: chest pain, shortness of breath, fever, chills, nausea, vomiting, diarrhea, change in mentation, falling, weakness, bleeding. DIET/what to eat:  Regular Diet ACTIVITY:  Activity as tolerated WOUND CARE: NONE 
 
EQUIPMENT needed:  NONE What to do if new or unexpected symptoms occur? If you experience any of the above symptoms (or should other concerns or questions arise after discharge) please call your primary care physician. Return to the emergency room if you cannot get hold of your doctor. · It is very important that you keep your follow-up appointment(s). · Please bring discharge papers, medication list (and/or medication bottles) to your follow-up appointments for review by your outpatient provider(s). · Please check the list of medications and be sure it includes every medication (even non-prescription medications) that your provider wants you to take. · It is important that you take the medication exactly as they are prescribed. · Keep your medication in the bottles provided by the pharmacist and keep a list of the medication names, dosages, and times to be taken in your wallet. · Do not take other medications without consulting your doctor. · If you have any questions about your medications or other instructions, please talk to your nurse or care provider before you leave the hospital. 
 
I understand that if any problems occur once I am at home I am to contact my physician. These instructions were explained to me and I had the opportunity to ask questions. Chart Review Routing History No Routing History on File

## 2018-07-14 NOTE — IP AVS SNAPSHOT
[address] 
[phone] Patient: Princess Lynch MRN: [id number] DNI:[id number]/93/[id number] A check sandy indicates which time of day the medication should be taken. My Medications START taking these medications Instructions Each Dose to Equal  
 Morning Noon Evening Bedtime  
 colchicine 0.6 mg tablet Your last dose was: Your next dose is: Take 1 Tab by mouth two (2) times a day for 45 days. 0.6 mg  
    
   
   
   
  
 famotidine 20 mg tablet Commonly known as:  PEPCID Your last dose was: Your next dose is: Take 1 Tab by mouth two (2) times a day. 20 mg  
    
   
   
   
  
 indomethacin 25 mg capsule Commonly known as:  INDOCIN Your last dose was: Your next dose is: Take 1 Cap by mouth three (3) times daily (with meals) for 14 days. 25 mg  
    
   
   
   
  
  
STOP taking these medications ALEVE 220 mg tablet Generic drug:  naproxen sodium PRESERVISION LUTEIN PO Where to Get Your Medications Information on where to get these meds will be given to you by the nurse or doctor. ! Ask your nurse or doctor about these medications  
  colchicine 0.6 mg tablet  
 famotidine 20 mg tablet  
 indomethacin 25 mg capsule

## 2018-07-14 NOTE — PROGRESS NOTES
Primary Nurse Yaa Monzon, RN performed a dual skin assessment on this patient No impairment noted  Marcelo score is 23

## 2018-07-14 NOTE — IP AVS SNAPSHOT
[address] [facility] 
[phone] Patient: Lauryn Olmedo MRN: [id number] IAJ:[birth year]18[birth year] About your hospitalization You were admitted on:  July 14, 2018 You last received care in the:  [facility] MED SURG You were discharged on:  July 15, 2018 Why you were hospitalized Your primary diagnosis was:  Not on File Your diagnoses also included:  Pleuritic Chest Pain Follow-up Information Follow up With Details Comments Contact Info  
 your PCP In 2 weeks None   None (395) Patient stated that they have no PCP Discharge Orders None A check sandy indicates which time of day the medication should be taken. My Medications START taking these medications Instructions Each Dose to Equal  
 Morning Noon Evening Bedtime  
 colchicine 0.6 mg tablet Your last dose was: Your next dose is: Take 1 Tab by mouth two (2) times a day for 45 days. 0.6 mg  
    
   
   
   
  
 famotidine 20 mg tablet Commonly known as:  PEPCID Your last dose was: Your next dose is: Take 1 Tab by mouth two (2) times a day. 20 mg  
    
   
   
   
  
 indomethacin 25 mg capsule Commonly known as:  INDOCIN Your last dose was: Your next dose is: Take 1 Cap by mouth three (3) times daily (with meals) for 14 days. 25 mg  
    
   
   
   
  
  
STOP taking these medications ALEVE 220 mg tablet Generic drug:  naproxen sodium PRESERVISION LUTEIN PO Where to Get Your Medications Information on where to get these meds will be given to you by the nurse or doctor. ! Ask your nurse or doctor about these medications  
  colchicine 0.6 mg tablet  
 famotidine 20 mg tablet  
 indomethacin 25 mg capsule Discharge Instructions Please bring this form with you to show your primary care provider at your follow-up appointment. Primary care provider:  Dr. Afshan Amezcua Discharging provider:  Santosh Carreno MD 
 
You have been admitted to the hospital with the following diagnoses: · Pleuritic chest pain · Pericarditis FOLLOW-UP CARE RECOMMENDATIONS: 
 
APPOINTMENTS: 
Follow-up Information Follow up With Details Comments Contact Info  
 your PCP In 2 weeks FOLLOW-UP TESTS recommended: NONE PENDING TEST RESULTS: 
At the time of your discharge the following test results are still pending: NONE Please make sure you review these results with your outpatient follow-up provider(s). SYMPTOMS to watch for: chest pain, shortness of breath, fever, chills, nausea, vomiting, diarrhea, change in mentation, falling, weakness, bleeding. DIET/what to eat:  Regular Diet ACTIVITY:  Activity as tolerated WOUND CARE: NONE 
 
EQUIPMENT needed:  NONE What to do if new or unexpected symptoms occur? If you experience any of the above symptoms (or should other concerns or questions arise after discharge) please call your primary care physician. Return to the emergency room if you cannot get hold of your doctor. · It is very important that you keep your follow-up appointment(s). · Please bring discharge papers, medication list (and/or medication bottles) to your follow-up appointments for review by your outpatient provider(s). · Please check the list of medications and be sure it includes every medication (even non-prescription medications) that your provider wants you to take. · It is important that you take the medication exactly as they are prescribed. · Keep your medication in the bottles provided by the pharmacist and keep a list of the medication names, dosages, and times to be taken in your wallet. · Do not take other medications without consulting your doctor. · If you have any questions about your medications or other instructions, please talk to your nurse or care provider before you leave the hospital. 
 
I understand that if any problems occur once I am at home I am to contact my physician. These instructions were explained to me and I had the opportunity to ask questions. [employer] Announcement We are excited to announce that we are making your provider's discharge notes available to you in [employer]. You will see these notes when they are completed and signed by the physician that discharged you from your recent hospital stay. If you have any questions or concerns about any information you see in [employer], please call the Health Information Department where you were seen or reach out to your Primary Care Provider for more information about your plan of care. Introducing [facility]! Max Koenig introduces [employer] patient portal. Now you can access parts of your medical record, email your doctor's office, and request medication refills online. 1. In your internet browser, go to [email or web address] 2. Click on the First Time User? Click Here link in the Sign In box. You will see the New Member Sign Up page. 3. Enter your [employer] Access Code exactly as it appears below. You will not need to use this code after youve completed the sign-up process. If you do not sign up before the expiration date, you must request a new code. · [employer] Access Code: [email or web address] Expires: 10/12/2018  9:20 AM 
 
4. Enter the last four digits of your Social Security Number (xxxx) and Date of Birth (mm/dd/yyyy) as indicated and click Submit. You will be taken to the next sign-up page. 5. Create a [employer] ID. This will be your [employer] login ID and cannot be changed, so think of one that is secure and easy to remember. 6. Create a [employer] password. You can change your password at any time. 7. Enter your Password Reset Question and Answer. This can be used at a later time if you forget your password. 8. Enter your e-mail address. You will receive e-mail notification when new information is available in [address] 9. Click Sign Up. You can now view and download portions of your medical record. 10. Click the Download Summary menu link to download a portable copy of your medical information. If you have questions, please visit the Frequently Asked Questions section of the [employer] website. Remember, [employer] is NOT to be used for urgent needs. For medical emergencies, dial 911. Now available from your iPhone and Android! Introducing Pankaj Braga As a hopscout patient, I wanted to make you aware of our electronic visit tool called Pankaj Braga. [employer] allows you to connect within minutes with a medical provider 24 hours a day, seven days a week via a mobile device or tablet or logging into a secure website from your computer. You can access Pankaj Gudinofin from anywhere in the [place]. A virtual visit might be right for you when you have a simple condition and feel like you just dont want to get out of bed, or cant get away from work for an appointment, when your regular [employer] provider is not available (evenings, weekends or holidays), or when youre out of town and need minor care. Electronic visits cost only $49 and if the [employer] provider determines a prescription is needed to treat your condition, one can be electronically transmitted to a nearby pharmacy*. Please take a moment to enroll today if you have not already done so. The enrollment process is free and takes just a few minutes. To enroll, please download the [employer] brenda to your tablet or phone, or visit [email or web address] to enroll on your computer.    
And, as an [address] [place] [address] patient with a [employer] account, the results of your visits will be scanned into your electronic medical record and your primary care provider will be able to view the scanned results. We urge you to continue to see your regular [facility] provider for your ongoing medical care. And while your primary care provider may not be the one available when you seek a Pankaj Gudinofin virtual visit, the peace of mind you get from getting a real diagnosis real time can be priceless. For more information on [employer], view our Frequently Asked Questions (FAQs) at [email or web address]. Sincerely, 
 
Carmen Looney MD 
Chief Medical Officer Rosamaria Joel *:  certain medications cannot be prescribed via [employer] Unresulted Labs-Please follow up with your PCP about these lab tests Order Current Status EKG, 12 LEAD, INITIAL Preliminary result EKG, 12 LEAD, INITIAL Preliminary result Providers Seen During Your Hospitalization Provider Specialty Primary office phone Sol Nicole MD Emergency Medicine [phone] Louise Wood MD Internal Medicine [phone] Your Primary Care Physician (PCP) Primary Care Physician Office Phone Office Fax NONE ** None ** ** None ** You are allergic to the following No active allergies Recent Documentation Height Breastfeeding? OB Status Smoking Status 1.651 m No Menopause Current Every Day Smoker Emergency Contacts Name Discharge Info Relation Home Work Mobile [address] CAREGIVER [3] Brother [24]   [phone] Patient Belongings The following personal items are in your possession at time of discharge: 
     Visual Aid: Glasses, With patient Please provide this summary of care documentation to your next provider.  
  
  
 
  
Signatures-by signing, you are acknowledging that this After Visit Summary has been reviewed with you and you have received a copy. Patient Signature:  ____________________________________________________________ Date:  ____________________________________________________________  
  
Marvetta Land Provider Signature:  ____________________________________________________________ Date:  ____________________________________________________________

## 2018-07-14 NOTE — H&P
History & Physical 
 
Date of admission: 7/14/2018 Patient name: Jacqui Han MRN: [id number] YOB: 1955 Age: 57 y.o. Primary care provider:  None Source of Information: patient, medical records Chief complain: chest pain History of present illness Jacqui Han is a 57 y.o. female who presents with no PMHx seen in ER for chest pain. Pt states she was woken up with Pleuritic chest pain, midsternal, radiating to rt neck and jaw. Pt states pain is worse with deep inspiration and leaning backwards. Pt denies any dizziness, n/v, diarrhea, abdominal pain. Pt had a temp of 99.1 and feel very cold in ER. No documented fever. History reviewed. No pertinent past medical history. Past Surgical History:  
Procedure Laterality Date  HX CHOLECYSTECTOMY  HX ORTHOPAEDIC    
 back pain Prior to Admission medications Not on File No Known Allergies History reviewed. No pertinent family history. Family history reviewed and non-contributory. Social history Patient resides X  Independently With family care Assisted living SNF Ambulates X  Independently With cane Assisted walker Alcohol history X  None Social  
  Chronic Smoking history X  None Former smoker Current smoker History Smoking Status  Current Every Day Smoker Smokeless Tobacco  
 Not on file Code status X  Full code DNR/DNI Code status discussed with the patient/caregivers. No Order Review of systems The patient denies any fever, chills, chest pain, cough, congestion, recent illness, palpitations, or dysuria. A comprehensive review of systems was negative except for that written in the History of Present Illness. The remainder of the review of systems was reviewed and is noncontributory. Physical Examination Visit Vitals  /66  Pulse 100  Temp 99.1 °F (37.3 °C)  Resp 24  
 Ht 5' 5\" (1.651 m)  SpO2 95%  Breastfeeding No  
  
   
O2 Device: Room air General:  Alert, cooperative, no distress Head:  Normocephalic, Eyes:  Conjunctivae/corneas clear. PERRL, EOMs intact E/N/M/T: Nares normal. Septum midline. No nasal drainage or sinus tenderness Clear oropharynx Neck: No carotid bruit Normal JVP Lungs:   Symmetrical chest expansion and respiratory effort Clear to auscultation bilaterally Chest wall:  No tenderness or deformity Heart:  Regular rhythm Sounds normal;   
Abdomen:   Soft, no tenderness Bowel sounds normal 
  
Back: No CVA tenderness Extremities: Extremities normal, atraumatic No cyanosis or edema No DVT signs Pulses 2+ and symmetric all extremities Skin: No rashes or ulcers Musculo- 
    skeletal: Gait not tested Normal symmetry, ROM, strength and tone Neuro: Normal cranial nerves Normal reflexes and sensation Psych: Alert, oriented x3 Normal affect, judgement and insight Geniturinary: none Data Review EKG:  NSR 99bpm, with PVC, low voltage QRS 
 
24 Hour Results: 
Recent Results (from the past 24 hour(s)) EKG, 12 LEAD, INITIAL Collection Time: 07/14/18  9:24 AM  
Result Value Ref Range Ventricular Rate 99 BPM  
 Atrial Rate 99 BPM  
 P-R Interval 166 ms  
 QRS Duration 62 ms Q-T Interval 316 ms  
 QTC Calculation (Bezet) 405 ms Calculated P Axis 66 degrees Calculated R Axis 36 degrees Calculated T Axis 74 degrees Diagnosis Sinus rhythm with occasional premature ventricular complexes Low voltage QRS Septal infarct , age undetermined No previous ECGs available CBC WITH AUTOMATED DIFF Collection Time: 07/14/18  9:36 AM  
Result Value Ref Range WBC 9.7 3.6 - 11.0 K/uL  
 RBC 4.97 3.80 - 5.20 M/uL  
 HGB 16.1 (H) 11.5 - 16.0 g/dL HCT 49.0 (H) 35.0 - 47.0 %  MCV 98.6 80.0 - 99.0 FL  
 MCH 32.4 26.0 - 34.0 PG  
 MCHC 32.9 30.0 - 36.5 g/dL  
 RDW 14.8 (H) 11.5 - 14.5 % PLATELET 580 207 - 082 K/uL MPV 10.7 8.9 - 12.9 FL  
 NRBC 0.0 0  WBC ABSOLUTE NRBC 0.00 0.00 - 0.01 K/uL NEUTROPHILS 69 32 - 75 % LYMPHOCYTES 20 12 - 49 % MONOCYTES 9 5 - 13 % EOSINOPHILS 2 0 - 7 % BASOPHILS 0 0 - 1 % IMMATURE GRANULOCYTES 0 0.0 - 0.5 % ABS. NEUTROPHILS 6.6 1.8 - 8.0 K/UL  
 ABS. LYMPHOCYTES 1.9 0.8 - 3.5 K/UL  
 ABS. MONOCYTES 0.9 0.0 - 1.0 K/UL  
 ABS. EOSINOPHILS 0.2 0.0 - 0.4 K/UL  
 ABS. BASOPHILS 0.0 0.0 - 0.1 K/UL  
 ABS. IMM. GRANS. 0.0 0.00 - 0.04 K/UL  
 DF AUTOMATED METABOLIC PANEL, COMPREHENSIVE Collection Time: 07/14/18  9:36 AM  
Result Value Ref Range Sodium 136 136 - 145 mmol/L Potassium 4.0 3.5 - 5.1 mmol/L Chloride 100 97 - 108 mmol/L  
 CO2 30 21 - 32 mmol/L Anion gap 6 5 - 15 mmol/L Glucose 137 (H) 65 - 100 mg/dL BUN 13 6 - 20 MG/DL Creatinine 0.89 0.55 - 1.02 MG/DL  
 BUN/Creatinine ratio 15 12 - 20 GFR est AA >60 >60 ml/min/1.73m2 GFR est non-AA >60 >60 ml/min/1.73m2 Calcium 8.9 8.5 - 10.1 MG/DL Bilirubin, total 0.9 0.2 - 1.0 MG/DL  
 ALT (SGPT) 41 12 - 78 U/L  
 AST (SGOT) 48 (H) 15 - 37 U/L Alk. phosphatase 153 (H) 45 - 117 U/L Protein, total 8.8 (H) 6.4 - 8.2 g/dL Albumin 3.4 (L) 3.5 - 5.0 g/dL Globulin 5.4 (H) 2.0 - 4.0 g/dL A-G Ratio 0.6 (L) 1.1 - 2.2 D DIMER Collection Time: 07/14/18  9:36 AM  
Result Value Ref Range D-dimer 1.04 (H) 0.00 - 0.65 mg/L FEU  
TROPONIN I Collection Time: 07/14/18  9:36 AM  
Result Value Ref Range Troponin-I, Qt. <0.05 <0.05 ng/mL C REACTIVE PROTEIN, QT Collection Time: 07/14/18  9:36 AM  
Result Value Ref Range C-Reactive protein 2.07 (H) 0.00 - 0.60 mg/dL Recent Labs  
   07/14/18 
 4112 WBC  9.7 HGB  16.1*  
HCT  49.0*  
PLT  173 Recent Labs  
   07/14/18 1989 NA  136  
K  4.0  
CL  100 CO2  30 GLU  137* BUN  13  
CREA  0.89 CA  8.9 ALB  3.4* TBILI  0.9 SGOT  48* ALT  41 Imaging CTA chest : no PE Assessment and Plan Active Problems: 
  Pleuritic chest pain (7/14/2018) Pleuritic Chest pain, suspect possible Pericarditis - CRP 2.07, ESR pending  
- EKG: low voltage QRS, will get ECHO 
- check troponin 
- Start Indocin and Colchicine 
- consider Cardiology eval if not improving - Pepcid BID Diet: regular Activity: as tolerated DVT prophylaxis: Lovenox Isolation precautions: none Consultations: Cardiology Anticipated disposition: in AM  
  
 
 
Signed by: Lonny Hoover MD  
 July 14, 2018 at 1:22 PM

## 2018-07-14 NOTE — PROCEDURES
[address]  *** FINAL REPORT ***    Name: Melva Griggs  MRN: [id number]  : 1955  HIS Order #: [id number]  [address] [facility] Visit #: [id number]  Date: 2018    TYPE OF TEST: Peripheral Venous Testing    REASON FOR TEST  Pain in limb    Right Leg:-  Deep venous thrombosis:           No  Superficial venous thrombosis:    No  Deep venous insufficiency:        Not examined  Superficial venous insufficiency: Not examined      INTERPRETATION/FINDINGS  PROCEDURE:  Color duplex ultrasound imaging of lower extremity veins. FINDINGS:       Right: The common femoral, deep femoral, femoral, popliteal,  posterior tibial, peroneal, and great saphenous are patent and without   evidence of thrombus; each is is fully compressible and there is no  narrowing of the flow channel on color Doppler imaging. Phasic flow  is observed in the common femoral vein. Left:   The common femoral vein is patent and without evidence of   thrombus. Phasic flow is observed. This extremity was not otherwise   evaluated. IMPRESSION:  No evidence of right lower extremity vein thrombosis. ADDITIONAL COMMENTS    I have personally reviewed the data relevant to the interpretation of  this  study.     TECHNOLOGIST: Erin Yoder RVT  Signed: 2018 10:47 AM    PHYSICIAN: Chris Alvarenga MD  Signed: 07/15/2018 06:40 AM

## 2018-07-14 NOTE — ROUTINE PROCESS
TRANSFER - OUT REPORT:    Verbal report given to Massimo Wiggins RN(name) on Flavio Seals  being transferred to [facility](unit) for routine progression of care       Report consisted of patients Situation, Background, Assessment and   Recommendations(SBAR). Information from the following report(s) SBAR was reviewed with the receiving nurse. Lines:   Peripheral IV 07/14/18 Left Antecubital (Active)        Opportunity for questions and clarification was provided.       Patient transported with:  Transportation

## 2018-07-15 VITALS
HEART RATE: 82 BPM | HEIGHT: 65 IN | TEMPERATURE: 97.9 F | SYSTOLIC BLOOD PRESSURE: 165 MMHG | DIASTOLIC BLOOD PRESSURE: 88 MMHG | RESPIRATION RATE: 18 BRPM | OXYGEN SATURATION: 89 %

## 2018-07-15 LAB
ATRIAL RATE: 101 BPM
ATRIAL RATE: 87 BPM
CALCULATED P AXIS, ECG09: 63 DEGREES
CALCULATED P AXIS, ECG09: 75 DEGREES
CALCULATED R AXIS, ECG10: 47 DEGREES
CALCULATED R AXIS, ECG10: 55 DEGREES
CALCULATED T AXIS, ECG11: 67 DEGREES
CALCULATED T AXIS, ECG11: 68 DEGREES
DIAGNOSIS, 93000: NORMAL
DIAGNOSIS, 93000: NORMAL
GLUCOSE BLD STRIP.AUTO-MCNC: 130 MG/DL (ref 65–100)
P-R INTERVAL, ECG05: 160 MS
P-R INTERVAL, ECG05: 164 MS
Q-T INTERVAL, ECG07: 340 MS
Q-T INTERVAL, ECG07: 366 MS
QRS DURATION, ECG06: 70 MS
QRS DURATION, ECG06: 80 MS
QTC CALCULATION (BEZET), ECG08: 440 MS
QTC CALCULATION (BEZET), ECG08: 440 MS
SERVICE CMNT-IMP: ABNORMAL
VENTRICULAR RATE, ECG03: 101 BPM
VENTRICULAR RATE, ECG03: 87 BPM

## 2018-07-15 PROCEDURE — C8923 2D TTE W OR W/O FOL W/CON,CO: HCPCS

## 2018-07-15 PROCEDURE — 82962 GLUCOSE BLOOD TEST: CPT

## 2018-07-15 PROCEDURE — 74011250637 HC RX REV CODE- 250/637: Performed by: HOSPITALIST

## 2018-07-15 PROCEDURE — 93005 ELECTROCARDIOGRAM TRACING: CPT

## 2018-07-15 PROCEDURE — 99218 HC RM OBSERVATION: CPT

## 2018-07-15 RX ORDER — COLCHICINE 0.6 MG/1
0.6 TABLET ORAL 2 TIMES DAILY
Qty: 90 TAB | Refills: 0 | Status: SHIPPED | OUTPATIENT
Start: 2018-07-15 | End: 2018-08-29

## 2018-07-15 RX ORDER — INDOMETHACIN 25 MG/1
25 CAPSULE ORAL
Qty: 42 CAP | Refills: 0 | Status: SHIPPED | OUTPATIENT
Start: 2018-07-15 | End: 2018-07-29

## 2018-07-15 RX ORDER — FAMOTIDINE 20 MG/1
20 TABLET, FILM COATED ORAL 2 TIMES DAILY
Qty: 60 TAB | Refills: 1 | Status: SHIPPED | OUTPATIENT
Start: 2018-07-15

## 2018-07-15 RX ADMIN — COLCHICINE 0.6 MG: 0.6 TABLET, FILM COATED ORAL at 08:28

## 2018-07-15 RX ADMIN — INDOMETHACIN 25 MG: 25 CAPSULE ORAL at 12:12

## 2018-07-15 RX ADMIN — INDOMETHACIN 25 MG: 25 CAPSULE ORAL at 08:28

## 2018-07-15 RX ADMIN — FAMOTIDINE 20 MG: 20 TABLET ORAL at 08:28

## 2018-07-15 NOTE — PROGRESS NOTES
Bedside shift change report given to Fede Villatoro (oncoming nurse) by Monica Bills RN (offgoing nurse). Report included the following information SBAR and Kardex.

## 2018-07-15 NOTE — DISCHARGE SUMMARY
Discharge Summary     Patient:  Swapna Mcconnell       MRN: [id number]       YOB: 1955       Age: 57 y.o. Date of admission:  7/14/2018    Date of discharge:  7/15/2018    Primary care provider: Dr. Deondre Lee    Admitting provider:  Anna Shaw MD    Discharging provider:  Anna Shaw MD - [phone]  If unavailable, call [phone] and ask the  to page the triage hospitalist.    Consultations  · None    Procedures  · * No surgery found *    Discharge destination: HOME. The patient is stable for discharge. Admission diagnosis  · Pleuritic chest pain    Current Discharge Medication List      START taking these medications    Details   colchicine 0.6 mg tablet Take 1 Tab by mouth two (2) times a day for 45 days. Qty: 90 Tab, Refills: 0      famotidine (PEPCID) 20 mg tablet Take 1 Tab by mouth two (2) times a day. Qty: 60 Tab, Refills: 1      indomethacin (INDOCIN) 25 mg capsule Take 1 Cap by mouth three (3) times daily (with meals) for 14 days. Qty: 42 Cap, Refills: 0         STOP taking these medications       vit C/vit E ac/lut/copper/zinc (PRESERVISION LUTEIN PO) Comments:   Reason for Stopping:         naproxen sodium (ALEVE) 220 mg tablet Comments:   Reason for Stopping: Follow-up Information     Follow up With Details Comments Contact Info    your PCP In 2 weeks            Final discharge diagnoses and brief hospital course  Please also refer to the admission H&P for details on the presenting problem. 57 y.o. female who presents with no PMHx seen in ER for chest pain. Pt states she was woken up with Pleuritic chest pain, midsternal, radiating to rt neck and jaw. Pt states pain is worse with deep inspiration and leaning backwards. Pt denies any dizziness, n/v, diarrhea, abdominal pain. Pt had a temp of 99.1 and feel very cold in ER.      Pleuritic Chest pain, suspect possible Pericarditis  - CRP 2.07,   - EKG: low voltage QRS  - ECHO done  - Trop negative  - Start Indocin for 2 weeks and Colchicine for 6 weeks  - Pepcid BID    Morbid Obesity POA      FOLLOW-UP TESTS recommended: NONE    PENDING TEST RESULTS:  At the time of your discharge the following test results are still pending: NONE  Please make sure you review these results with your outpatient follow-up provider(s). SYMPTOMS to watch for: chest pain, shortness of breath, fever, chills, nausea, vomiting, diarrhea, change in mentation, falling, weakness, bleeding. DIET/what to eat:  Regular Diet    ACTIVITY:  Activity as tolerated    WOUND CARE: NONE    EQUIPMENT needed:  NONE    Physical examination at discharge  Visit Vitals    /80 (BP 1 Location: Right arm, BP Patient Position: Sitting)    Pulse 90    Temp 97.5 °F (36.4 °C)    Resp 18    Ht 5' 5\" (1.651 m)    SpO2 92%    Breastfeeding No     AO3, Morbid obesity  Lung: CTA  CVS: RRR  ABd: soft NT ND  Ext: no edema      Pertinent imaging studies:        Recent Labs      07/14/18   0936   WBC  9.7   HGB  16.1*   HCT  49.0*   PLT  173     Recent Labs      07/14/18   0936   NA  136   K  4.0   CL  100   CO2  30   BUN  13   CREA  0.89   GLU  137*   CA  8.9     Recent Labs      07/14/18   0936   SGOT  48*   AP  153*   TP  8.8*   ALB  3.4*   GLOB  5.4*     No results for input(s): INR, PTP, APTT in the last 72 hours. No lab exists for component: INREXT   No results for input(s): FE, TIBC, PSAT, FERR in the last 72 hours. No results for input(s): PH, PCO2, PO2 in the last 72 hours. No results for input(s): CPK, CKMB in the last 72 hours.     No lab exists for component: TROPONINI  No components found for: [address] [facility]    Chronic Diagnoses:    Problem List as of 7/15/2018  Never Reviewed          Codes Class Noted - Resolved    Pleuritic chest pain ICD-10-CM: R07.81  ICD-9-CM: 786.52  7/14/2018 - Present              Time spent on discharge related activities today greater than 30 minutes.       Signed:  Jesika Escalera MD                 Hospitalist, Internal Medicine      Cc: None

## 2018-07-15 NOTE — PROGRESS NOTES
Bedside and Verbal shift change report given to Chacho Chino RN (oncoming nurse) by Keturah Kahn RN (offgoing nurse). Report included the following information SBAR, Kardex, Intake/Output, MAR and Recent Results.

## 2018-07-15 NOTE — PROGRESS NOTES
[address] [place] [address].      
 
 
7/15/2018 RE: Jose John To Whom It May Concern, This is to certify that Saji Warner has been under the care of [facility] due to acute illness from 7/14/2018 until 7/15/2018. Please excuse her from work until 7/22/18 Jose John may return to work on/after 7/23/18. Please feel free to contact my office ([phone]) if you have any questions or concerns. Thank you for your assistance in this matter. Sincerely, Laxmi Garcia M.D Internal Medicine

## 2018-07-15 NOTE — DISCHARGE INSTRUCTIONS
Please bring this form with you to show your primary care provider at your follow-up appointment. Primary care provider:  Dr. Ebenezer Collado    Discharging provider:  Crista Croft MD    You have been admitted to the hospital with the following diagnoses:  · Pleuritic chest pain   · Pericarditis    FOLLOW-UP CARE RECOMMENDATIONS:    APPOINTMENTS:  Follow-up Information     Follow up With Details Comments Contact Info    your PCP In 2 weeks          FOLLOW-UP TESTS recommended: NONE    PENDING TEST RESULTS:  At the time of your discharge the following test results are still pending: NONE  Please make sure you review these results with your outpatient follow-up provider(s). SYMPTOMS to watch for: chest pain, shortness of breath, fever, chills, nausea, vomiting, diarrhea, change in mentation, falling, weakness, bleeding. DIET/what to eat:  Regular Diet    ACTIVITY:  Activity as tolerated    WOUND CARE: NONE    EQUIPMENT needed:  NONE      What to do if new or unexpected symptoms occur? If you experience any of the above symptoms (or should other concerns or questions arise after discharge) please call your primary care physician. Return to the emergency room if you cannot get hold of your doctor. · It is very important that you keep your follow-up appointment(s). · Please bring discharge papers, medication list (and/or medication bottles) to your follow-up appointments for review by your outpatient provider(s). · Please check the list of medications and be sure it includes every medication (even non-prescription medications) that your provider wants you to take. · It is important that you take the medication exactly as they are prescribed. · Keep your medication in the bottles provided by the pharmacist and keep a list of the medication names, dosages, and times to be taken in your wallet. · Do not take other medications without consulting your doctor.    · If you have any questions about your medications or other instructions, please talk to your nurse or care provider before you leave the hospital.    I understand that if any problems occur once I am at home I am to contact my physician. These instructions were explained to me and I had the opportunity to ask questions.